# Patient Record
Sex: FEMALE | Race: WHITE | Employment: OTHER | URBAN - METROPOLITAN AREA
[De-identification: names, ages, dates, MRNs, and addresses within clinical notes are randomized per-mention and may not be internally consistent; named-entity substitution may affect disease eponyms.]

---

## 2018-05-17 ENCOUNTER — APPOINTMENT (OUTPATIENT)
Dept: LAB | Age: 83
End: 2018-05-17
Attending: FAMILY MEDICINE
Payer: MEDICARE

## 2018-05-17 DIAGNOSIS — I50.9 CHRONIC CONGESTIVE HEART FAILURE, UNSPECIFIED HEART FAILURE TYPE (HCC): ICD-10-CM

## 2018-05-17 DIAGNOSIS — R60.0 LOCALIZED EDEMA: ICD-10-CM

## 2018-05-17 PROCEDURE — 80048 BASIC METABOLIC PNL TOTAL CA: CPT

## 2018-05-17 PROCEDURE — 36415 COLL VENOUS BLD VENIPUNCTURE: CPT

## 2018-08-16 ENCOUNTER — APPOINTMENT (OUTPATIENT)
Dept: LAB | Age: 83
End: 2018-08-16
Attending: FAMILY MEDICINE
Payer: MEDICARE

## 2018-08-16 DIAGNOSIS — I50.9 CONGESTIVE HEART FAILURE, UNSPECIFIED HF CHRONICITY, UNSPECIFIED HEART FAILURE TYPE (HCC): ICD-10-CM

## 2018-08-16 DIAGNOSIS — I50.32 CHRONIC DIASTOLIC HEART FAILURE (HCC): ICD-10-CM

## 2018-08-16 LAB
ANION GAP SERPL CALC-SCNC: 7 MMOL/L (ref 0–18)
BUN BLD-MCNC: 14 MG/DL (ref 8–20)
BUN/CREAT SERPL: 14.7 (ref 10–20)
CALCIUM BLD-MCNC: 8.9 MG/DL (ref 8.3–10.3)
CHLORIDE SERPL-SCNC: 107 MMOL/L (ref 101–111)
CO2 SERPL-SCNC: 27 MMOL/L (ref 22–32)
CREAT BLD-MCNC: 0.95 MG/DL (ref 0.55–1.02)
GLUCOSE BLD-MCNC: 109 MG/DL (ref 70–99)
OSMOLALITY SERPL CALC.SUM OF ELEC: 293 MOSM/KG (ref 275–295)
POTASSIUM SERPL-SCNC: 4.1 MMOL/L (ref 3.6–5.1)
SODIUM SERPL-SCNC: 141 MMOL/L (ref 136–144)

## 2018-08-16 PROCEDURE — 80048 BASIC METABOLIC PNL TOTAL CA: CPT

## 2018-08-16 PROCEDURE — 36415 COLL VENOUS BLD VENIPUNCTURE: CPT

## 2018-11-29 ENCOUNTER — APPOINTMENT (OUTPATIENT)
Dept: LAB | Age: 83
End: 2018-11-29
Attending: FAMILY MEDICINE
Payer: MEDICARE

## 2018-11-29 DIAGNOSIS — I50.32 CHRONIC DIASTOLIC HEART FAILURE (HCC): ICD-10-CM

## 2018-11-29 DIAGNOSIS — R60.9 EDEMA, UNSPECIFIED TYPE: ICD-10-CM

## 2018-11-29 PROCEDURE — 80048 BASIC METABOLIC PNL TOTAL CA: CPT

## 2018-11-29 PROCEDURE — 36415 COLL VENOUS BLD VENIPUNCTURE: CPT

## 2019-06-13 ENCOUNTER — APPOINTMENT (OUTPATIENT)
Dept: LAB | Age: 84
End: 2019-06-13
Attending: FAMILY MEDICINE
Payer: MEDICARE

## 2019-06-13 DIAGNOSIS — I50.9 CONGESTIVE HEART FAILURE, UNSPECIFIED HF CHRONICITY, UNSPECIFIED HEART FAILURE TYPE (HCC): ICD-10-CM

## 2019-06-13 DIAGNOSIS — R60.9 EDEMA, UNSPECIFIED TYPE: ICD-10-CM

## 2019-06-13 PROCEDURE — 80048 BASIC METABOLIC PNL TOTAL CA: CPT

## 2019-06-13 PROCEDURE — 36415 COLL VENOUS BLD VENIPUNCTURE: CPT

## 2019-08-06 ENCOUNTER — APPOINTMENT (OUTPATIENT)
Dept: LAB | Age: 84
End: 2019-08-06
Attending: FAMILY MEDICINE
Payer: MEDICARE

## 2019-08-06 DIAGNOSIS — E03.9 HYPOTHYROIDISM, UNSPECIFIED TYPE: ICD-10-CM

## 2019-08-06 DIAGNOSIS — I50.32 CHRONIC DIASTOLIC HEART FAILURE (HCC): ICD-10-CM

## 2019-08-06 LAB
ANION GAP SERPL CALC-SCNC: 7 MMOL/L (ref 0–18)
BUN BLD-MCNC: 14 MG/DL (ref 7–18)
BUN/CREAT SERPL: 15.6 (ref 10–20)
CALCIUM BLD-MCNC: 9.2 MG/DL (ref 8.5–10.1)
CHLORIDE SERPL-SCNC: 107 MMOL/L (ref 98–112)
CO2 SERPL-SCNC: 24 MMOL/L (ref 21–32)
CREAT BLD-MCNC: 0.9 MG/DL (ref 0.55–1.02)
GLUCOSE BLD-MCNC: 102 MG/DL (ref 70–99)
OSMOLALITY SERPL CALC.SUM OF ELEC: 287 MOSM/KG (ref 275–295)
POTASSIUM SERPL-SCNC: 4.2 MMOL/L (ref 3.5–5.1)
SODIUM SERPL-SCNC: 138 MMOL/L (ref 136–145)
TSI SER-ACNC: 3.63 MIU/ML (ref 0.36–3.74)

## 2019-08-06 PROCEDURE — 84443 ASSAY THYROID STIM HORMONE: CPT

## 2019-08-06 PROCEDURE — 36415 COLL VENOUS BLD VENIPUNCTURE: CPT

## 2019-08-06 PROCEDURE — 80048 BASIC METABOLIC PNL TOTAL CA: CPT

## 2019-09-17 ENCOUNTER — APPOINTMENT (OUTPATIENT)
Dept: GENERAL RADIOLOGY | Facility: HOSPITAL | Age: 84
End: 2019-09-17
Attending: EMERGENCY MEDICINE
Payer: MEDICARE

## 2019-09-17 ENCOUNTER — HOSPITAL ENCOUNTER (EMERGENCY)
Facility: HOSPITAL | Age: 84
Discharge: HOME | End: 2019-09-17
Attending: EMERGENCY MEDICINE
Payer: MEDICARE

## 2019-09-17 ENCOUNTER — APPOINTMENT (OUTPATIENT)
Dept: ULTRASOUND IMAGING | Facility: HOSPITAL | Age: 84
End: 2019-09-17
Attending: EMERGENCY MEDICINE
Payer: MEDICARE

## 2019-09-17 VITALS
HEART RATE: 65 BPM | BODY MASS INDEX: 48.73 KG/M2 | RESPIRATION RATE: 18 BRPM | TEMPERATURE: 98 F | SYSTOLIC BLOOD PRESSURE: 133 MMHG | DIASTOLIC BLOOD PRESSURE: 53 MMHG | OXYGEN SATURATION: 94 % | HEIGHT: 63 IN | WEIGHT: 275 LBS

## 2019-09-17 DIAGNOSIS — L03.119 CELLULITIS OF LOWER EXTREMITY, UNSPECIFIED LATERALITY: Primary | ICD-10-CM

## 2019-09-17 LAB
ALBUMIN SERPL-MCNC: 3.4 G/DL (ref 3.4–5)
ALBUMIN/GLOB SERPL: 0.8 {RATIO} (ref 1–2)
ALP LIVER SERPL-CCNC: 65 U/L (ref 55–142)
ALT SERPL-CCNC: 15 U/L (ref 13–56)
ANION GAP SERPL CALC-SCNC: 7 MMOL/L (ref 0–18)
AST SERPL-CCNC: 16 U/L (ref 15–37)
BASOPHILS # BLD AUTO: 0.09 X10(3) UL (ref 0–0.2)
BASOPHILS NFR BLD AUTO: 1 %
BILIRUB SERPL-MCNC: 0.3 MG/DL (ref 0.1–2)
BILIRUB UR QL STRIP.AUTO: NEGATIVE
BUN BLD-MCNC: 16 MG/DL (ref 7–18)
BUN/CREAT SERPL: 14.7 (ref 10–20)
CALCIUM BLD-MCNC: 8.9 MG/DL (ref 8.5–10.1)
CHLORIDE SERPL-SCNC: 105 MMOL/L (ref 98–112)
CLARITY UR REFRACT.AUTO: CLEAR
CO2 SERPL-SCNC: 26 MMOL/L (ref 21–32)
CREAT BLD-MCNC: 1.09 MG/DL (ref 0.55–1.02)
DEPRECATED RDW RBC AUTO: 47.9 FL (ref 35.1–46.3)
EOSINOPHIL # BLD AUTO: 0.32 X10(3) UL (ref 0–0.7)
EOSINOPHIL NFR BLD AUTO: 3.7 %
ERYTHROCYTE [DISTWIDTH] IN BLOOD BY AUTOMATED COUNT: 13 % (ref 11–15)
GLOBULIN PLAS-MCNC: 4.2 G/DL (ref 2.8–4.4)
GLUCOSE BLD-MCNC: 149 MG/DL (ref 70–99)
GLUCOSE UR STRIP.AUTO-MCNC: NEGATIVE MG/DL
HCT VFR BLD AUTO: 47 % (ref 35–48)
HGB BLD-MCNC: 14.9 G/DL (ref 12–16)
HYALINE CASTS #/AREA URNS AUTO: PRESENT /LPF
IMM GRANULOCYTES # BLD AUTO: 0.04 X10(3) UL (ref 0–1)
IMM GRANULOCYTES NFR BLD: 0.5 %
KETONES UR STRIP.AUTO-MCNC: NEGATIVE MG/DL
LEUKOCYTE ESTERASE UR QL STRIP.AUTO: NEGATIVE
LYMPHOCYTES # BLD AUTO: 1.13 X10(3) UL (ref 1–4)
LYMPHOCYTES NFR BLD AUTO: 13 %
M PROTEIN MFR SERPL ELPH: 7.6 G/DL (ref 6.4–8.2)
MCH RBC QN AUTO: 31.6 PG (ref 26–34)
MCHC RBC AUTO-ENTMCNC: 31.7 G/DL (ref 31–37)
MCV RBC AUTO: 99.8 FL (ref 80–100)
MONOCYTES # BLD AUTO: 0.82 X10(3) UL (ref 0.1–1)
MONOCYTES NFR BLD AUTO: 9.4 %
NEUTROPHILS # BLD AUTO: 6.29 X10 (3) UL (ref 1.5–7.7)
NEUTROPHILS # BLD AUTO: 6.29 X10(3) UL (ref 1.5–7.7)
NEUTROPHILS NFR BLD AUTO: 72.4 %
NITRITE UR QL STRIP.AUTO: NEGATIVE
NT-PROBNP SERPL-MCNC: 195 PG/ML (ref ?–450)
OSMOLALITY SERPL CALC.SUM OF ELEC: 290 MOSM/KG (ref 275–295)
PH UR STRIP.AUTO: 6 [PH] (ref 4.5–8)
PLATELET # BLD AUTO: 199 10(3)UL (ref 150–450)
POTASSIUM SERPL-SCNC: 4 MMOL/L (ref 3.5–5.1)
PROT UR STRIP.AUTO-MCNC: NEGATIVE MG/DL
RBC # BLD AUTO: 4.71 X10(6)UL (ref 3.8–5.3)
SODIUM SERPL-SCNC: 138 MMOL/L (ref 136–145)
SP GR UR STRIP.AUTO: 1.01 (ref 1–1.03)
UROBILINOGEN UR STRIP.AUTO-MCNC: <2 MG/DL
WBC # BLD AUTO: 8.7 X10(3) UL (ref 4–11)

## 2019-09-17 PROCEDURE — 80053 COMPREHEN METABOLIC PANEL: CPT | Performed by: EMERGENCY MEDICINE

## 2019-09-17 PROCEDURE — 36415 COLL VENOUS BLD VENIPUNCTURE: CPT

## 2019-09-17 PROCEDURE — 99285 EMERGENCY DEPT VISIT HI MDM: CPT

## 2019-09-17 PROCEDURE — 81001 URINALYSIS AUTO W/SCOPE: CPT | Performed by: EMERGENCY MEDICINE

## 2019-09-17 PROCEDURE — 83880 ASSAY OF NATRIURETIC PEPTIDE: CPT | Performed by: EMERGENCY MEDICINE

## 2019-09-17 PROCEDURE — 93970 EXTREMITY STUDY: CPT | Performed by: EMERGENCY MEDICINE

## 2019-09-17 PROCEDURE — 96374 THER/PROPH/DIAG INJ IV PUSH: CPT

## 2019-09-17 PROCEDURE — 87040 BLOOD CULTURE FOR BACTERIA: CPT | Performed by: EMERGENCY MEDICINE

## 2019-09-17 PROCEDURE — 99284 EMERGENCY DEPT VISIT MOD MDM: CPT

## 2019-09-17 PROCEDURE — 85025 COMPLETE CBC W/AUTO DIFF WBC: CPT | Performed by: EMERGENCY MEDICINE

## 2019-09-17 PROCEDURE — 71045 X-RAY EXAM CHEST 1 VIEW: CPT | Performed by: EMERGENCY MEDICINE

## 2019-09-17 RX ORDER — CEPHALEXIN 500 MG/1
500 CAPSULE ORAL 3 TIMES DAILY
Qty: 30 CAPSULE | Refills: 0 | Status: SHIPPED | OUTPATIENT
Start: 2019-09-17 | End: 2019-09-27

## 2019-09-17 RX ORDER — ATENOLOL 50 MG/1
50 TABLET ORAL DAILY
COMMUNITY

## 2019-09-17 RX ORDER — CEFAZOLIN SODIUM/WATER 2 G/20 ML
2 SYRINGE (ML) INTRAVENOUS ONCE
Status: COMPLETED | OUTPATIENT
Start: 2019-09-17 | End: 2019-09-17

## 2019-09-17 NOTE — PROGRESS NOTES
Pharmacy Note:   [employer] approved dose adjustment for: Damion Kerr has been prescribed Ancef 1 gram IV x 1 dose for the ER. CrCl cannot be calculated (Patient's most recent lab result is older than the maximum 7 days allowed. ).  Body mass inde

## 2019-09-17 NOTE — ED PROVIDER NOTES
Patient Seen in: [facility] Emergency Department      History   Patient presents with:  Swelling Edema (cardiovascular, metabolic)    Stated Complaint: swelling to lower extremities    HPI    Patient is a 26-year-old female, medical history noted be Quit date: 1978        Years since quittin.7      Smokeless tobacco: Never Used    Alcohol use: No    Drug use: No             Review of Systems    Positive for stated complaint: swelling to lower extremities  Other systems are as noted in HPI.   C other components within normal limits   URINALYSIS WITH CULTURE REFLEX - Abnormal; Notable for the following components:    Blood Urine Moderate (*)     RBC URINE 6-10 (*)     Bacteria Urine Rare (*)     Squamous Epi.  Cells Moderate (*)     Hyaline Casts P (cpt=71045)    Result Date: 9/17/2019  CONCLUSION:  Subtle left lower lobe consolidation. Mild pulmonary vascular congestion is noted.     Dictated by: Jennifer Guillen MD on 9/17/2019 at 13:37     Approved by: Jennifer Guillen MD              We will

## 2019-09-17 NOTE — ED NOTES
RN spoke with [facility] dispatch in regards to ETA. They stated someone was on their way and anticipated  time was 1845. Patient and family updated.

## 2019-09-17 NOTE — ED INITIAL ASSESSMENT (HPI)
Bilateral lower extremity swelling x1 week. No improvement with lasix Rash that is warm to touch to bilateral extremities x1 week.  No improvement with topical ointment

## 2019-09-26 ENCOUNTER — APPOINTMENT (OUTPATIENT)
Dept: LAB | Age: 84
End: 2019-09-26
Attending: FAMILY MEDICINE
Payer: MEDICARE

## 2019-09-26 DIAGNOSIS — I50.9 CONGESTIVE HEART FAILURE, UNSPECIFIED HF CHRONICITY, UNSPECIFIED HEART FAILURE TYPE (HCC): ICD-10-CM

## 2019-09-26 LAB
ANION GAP SERPL CALC-SCNC: 10 MMOL/L (ref 0–18)
BUN BLD-MCNC: 17 MG/DL (ref 7–18)
BUN/CREAT SERPL: 15.7 (ref 10–20)
CALCIUM BLD-MCNC: 9 MG/DL (ref 8.5–10.1)
CHLORIDE SERPL-SCNC: 106 MMOL/L (ref 98–112)
CO2 SERPL-SCNC: 26 MMOL/L (ref 21–32)
CREAT BLD-MCNC: 1.08 MG/DL (ref 0.55–1.02)
GLUCOSE BLD-MCNC: 124 MG/DL (ref 70–99)
OSMOLALITY SERPL CALC.SUM OF ELEC: 297 MOSM/KG (ref 275–295)
POTASSIUM SERPL-SCNC: 4.2 MMOL/L (ref 3.5–5.1)
SODIUM SERPL-SCNC: 142 MMOL/L (ref 136–145)

## 2019-09-26 PROCEDURE — 80048 BASIC METABOLIC PNL TOTAL CA: CPT

## 2019-09-26 PROCEDURE — 36415 COLL VENOUS BLD VENIPUNCTURE: CPT

## 2019-10-24 ENCOUNTER — APPOINTMENT (OUTPATIENT)
Dept: LAB | Age: 84
End: 2019-10-24
Attending: FAMILY MEDICINE
Payer: MEDICARE

## 2019-10-24 DIAGNOSIS — I50.9 CONGESTIVE HEART FAILURE, UNSPECIFIED HF CHRONICITY, UNSPECIFIED HEART FAILURE TYPE (HCC): ICD-10-CM

## 2019-10-24 PROCEDURE — 80048 BASIC METABOLIC PNL TOTAL CA: CPT

## 2019-10-24 PROCEDURE — 36415 COLL VENOUS BLD VENIPUNCTURE: CPT

## 2019-12-05 ENCOUNTER — APPOINTMENT (OUTPATIENT)
Dept: LAB | Age: 84
End: 2019-12-05
Attending: FAMILY MEDICINE
Payer: MEDICARE

## 2019-12-05 DIAGNOSIS — R60.9 EDEMA, UNSPECIFIED TYPE: ICD-10-CM

## 2019-12-05 DIAGNOSIS — I50.9 CONGESTIVE HEART FAILURE, UNSPECIFIED HF CHRONICITY, UNSPECIFIED HEART FAILURE TYPE (HCC): ICD-10-CM

## 2019-12-05 PROCEDURE — 80048 BASIC METABOLIC PNL TOTAL CA: CPT

## 2019-12-05 PROCEDURE — 36415 COLL VENOUS BLD VENIPUNCTURE: CPT

## 2020-01-02 ENCOUNTER — APPOINTMENT (OUTPATIENT)
Dept: LAB | Age: 85
End: 2020-01-02
Attending: FAMILY MEDICINE
Payer: MEDICARE

## 2020-01-02 DIAGNOSIS — I50.32 CHRONIC DIASTOLIC HEART FAILURE (HCC): ICD-10-CM

## 2020-01-02 LAB
ANION GAP SERPL CALC-SCNC: 5 MMOL/L (ref 0–18)
BUN BLD-MCNC: 15 MG/DL (ref 7–18)
BUN/CREAT SERPL: 16.3 (ref 10–20)
CALCIUM BLD-MCNC: 8.9 MG/DL (ref 8.5–10.1)
CHLORIDE SERPL-SCNC: 108 MMOL/L (ref 98–112)
CO2 SERPL-SCNC: 28 MMOL/L (ref 21–32)
CREAT BLD-MCNC: 0.92 MG/DL (ref 0.55–1.02)
GLUCOSE BLD-MCNC: 107 MG/DL (ref 70–99)
OSMOLALITY SERPL CALC.SUM OF ELEC: 293 MOSM/KG (ref 275–295)
PATIENT FASTING Y/N/NP: YES
POTASSIUM SERPL-SCNC: 4.7 MMOL/L (ref 3.5–5.1)
SODIUM SERPL-SCNC: 141 MMOL/L (ref 136–145)

## 2020-01-02 PROCEDURE — 36415 COLL VENOUS BLD VENIPUNCTURE: CPT

## 2020-01-02 PROCEDURE — 80048 BASIC METABOLIC PNL TOTAL CA: CPT

## 2020-01-16 ENCOUNTER — NURSE ONLY (OUTPATIENT)
Dept: LAB | Age: 85
End: 2020-01-16
Attending: INTERNAL MEDICINE
Payer: MEDICARE

## 2020-01-16 DIAGNOSIS — Z00.00 ENCOUNTER FOR INITIAL PREVENTIVE PHYSICAL EXAMINATION COVERED BY MEDICARE: ICD-10-CM

## 2020-01-16 DIAGNOSIS — I50.9 CONGESTIVE HEART FAILURE, UNSPECIFIED HF CHRONICITY, UNSPECIFIED HEART FAILURE TYPE (HCC): ICD-10-CM

## 2020-01-16 DIAGNOSIS — E66.9 OBESITY, UNSPECIFIED CLASSIFICATION, UNSPECIFIED OBESITY TYPE, UNSPECIFIED WHETHER SERIOUS COMORBIDITY PRESENT: ICD-10-CM

## 2020-01-16 LAB
ALBUMIN SERPL-MCNC: 3.4 G/DL (ref 3.4–5)
ALBUMIN/GLOB SERPL: 0.8 {RATIO} (ref 1–2)
ALP LIVER SERPL-CCNC: 58 U/L (ref 55–142)
ALT SERPL-CCNC: 17 U/L (ref 13–56)
ANION GAP SERPL CALC-SCNC: 9 MMOL/L (ref 0–18)
AST SERPL-CCNC: 18 U/L (ref 15–37)
BASOPHILS # BLD AUTO: 0.08 X10(3) UL (ref 0–0.2)
BASOPHILS NFR BLD AUTO: 1 %
BILIRUB SERPL-MCNC: 0.5 MG/DL (ref 0.1–2)
BUN BLD-MCNC: 16 MG/DL (ref 7–18)
BUN/CREAT SERPL: 16.2 (ref 10–20)
CALCIUM BLD-MCNC: 9.4 MG/DL (ref 8.5–10.1)
CHLORIDE SERPL-SCNC: 107 MMOL/L (ref 98–112)
CHOLEST SMN-MCNC: 124 MG/DL (ref ?–200)
CO2 SERPL-SCNC: 26 MMOL/L (ref 21–32)
CREAT BLD-MCNC: 0.99 MG/DL (ref 0.55–1.02)
DEPRECATED RDW RBC AUTO: 49.1 FL (ref 35.1–46.3)
EOSINOPHIL # BLD AUTO: 0.46 X10(3) UL (ref 0–0.7)
EOSINOPHIL NFR BLD AUTO: 5.5 %
ERYTHROCYTE [DISTWIDTH] IN BLOOD BY AUTOMATED COUNT: 13.2 % (ref 11–15)
FOLATE SERPL-MCNC: 10.5 NG/ML (ref 8.7–?)
GLOBULIN PLAS-MCNC: 4.5 G/DL (ref 2.8–4.4)
GLUCOSE BLD-MCNC: 110 MG/DL (ref 70–99)
HCT VFR BLD AUTO: 46.4 % (ref 35–48)
HDLC SERPL-MCNC: 32 MG/DL (ref 40–59)
HGB BLD-MCNC: 14.6 G/DL (ref 12–16)
IMM GRANULOCYTES # BLD AUTO: 0.01 X10(3) UL (ref 0–1)
IMM GRANULOCYTES NFR BLD: 0.1 %
LDLC SERPL CALC-MCNC: 62 MG/DL (ref ?–100)
LYMPHOCYTES # BLD AUTO: 2.14 X10(3) UL (ref 1–4)
LYMPHOCYTES NFR BLD AUTO: 25.5 %
M PROTEIN MFR SERPL ELPH: 7.9 G/DL (ref 6.4–8.2)
MCH RBC QN AUTO: 31.9 PG (ref 26–34)
MCHC RBC AUTO-ENTMCNC: 31.5 G/DL (ref 31–37)
MCV RBC AUTO: 101.3 FL (ref 80–100)
MONOCYTES # BLD AUTO: 0.77 X10(3) UL (ref 0.1–1)
MONOCYTES NFR BLD AUTO: 9.2 %
NEUTROPHILS # BLD AUTO: 4.93 X10 (3) UL (ref 1.5–7.7)
NEUTROPHILS # BLD AUTO: 4.93 X10(3) UL (ref 1.5–7.7)
NEUTROPHILS NFR BLD AUTO: 58.7 %
NONHDLC SERPL-MCNC: 92 MG/DL (ref ?–130)
OSMOLALITY SERPL CALC.SUM OF ELEC: 296 MOSM/KG (ref 275–295)
PATIENT FASTING Y/N/NP: YES
PATIENT FASTING Y/N/NP: YES
PLATELET # BLD AUTO: 217 10(3)UL (ref 150–450)
POTASSIUM SERPL-SCNC: 3.6 MMOL/L (ref 3.5–5.1)
RBC # BLD AUTO: 4.58 X10(6)UL (ref 3.8–5.3)
SODIUM SERPL-SCNC: 142 MMOL/L (ref 136–145)
TRIGL SERPL-MCNC: 152 MG/DL (ref 30–149)
TSI SER-ACNC: 3.61 MIU/ML (ref 0.36–3.74)
VIT B12 SERPL-MCNC: 823 PG/ML (ref 193–986)
VIT D+METAB SERPL-MCNC: 31.4 NG/ML (ref 30–100)
VLDLC SERPL CALC-MCNC: 30 MG/DL (ref 0–30)
WBC # BLD AUTO: 8.4 X10(3) UL (ref 4–11)

## 2020-01-16 PROCEDURE — 82746 ASSAY OF FOLIC ACID SERUM: CPT

## 2020-01-16 PROCEDURE — 82607 VITAMIN B-12: CPT

## 2020-01-16 PROCEDURE — 84443 ASSAY THYROID STIM HORMONE: CPT

## 2020-01-16 PROCEDURE — 82306 VITAMIN D 25 HYDROXY: CPT

## 2020-01-16 PROCEDURE — 36415 COLL VENOUS BLD VENIPUNCTURE: CPT

## 2020-01-16 PROCEDURE — 85025 COMPLETE CBC W/AUTO DIFF WBC: CPT

## 2020-01-16 PROCEDURE — 80061 LIPID PANEL: CPT

## 2020-01-16 PROCEDURE — 80053 COMPREHEN METABOLIC PANEL: CPT

## 2020-04-14 ENCOUNTER — NURSE ONLY (OUTPATIENT)
Dept: LAB | Age: 85
End: 2020-04-14
Attending: INTERNAL MEDICINE
Payer: MEDICARE

## 2020-04-14 DIAGNOSIS — I50.9 CONGESTIVE HEART FAILURE, UNSPECIFIED HF CHRONICITY, UNSPECIFIED HEART FAILURE TYPE (HCC): ICD-10-CM

## 2020-04-14 DIAGNOSIS — R60.0 LOCALIZED EDEMA: ICD-10-CM

## 2020-04-14 PROCEDURE — 36415 COLL VENOUS BLD VENIPUNCTURE: CPT

## 2020-04-14 PROCEDURE — 80053 COMPREHEN METABOLIC PANEL: CPT

## 2020-04-14 PROCEDURE — 85025 COMPLETE CBC W/AUTO DIFF WBC: CPT

## 2020-07-14 ENCOUNTER — NURSE ONLY (OUTPATIENT)
Dept: LAB | Age: 85
End: 2020-07-14
Attending: INTERNAL MEDICINE
Payer: MEDICARE

## 2020-07-14 DIAGNOSIS — I50.32 CHRONIC DIASTOLIC HEART FAILURE (HCC): ICD-10-CM

## 2020-07-14 DIAGNOSIS — R60.9 EDEMA, UNSPECIFIED TYPE: ICD-10-CM

## 2020-07-14 LAB
ALBUMIN SERPL-MCNC: 3 G/DL (ref 3.4–5)
ALBUMIN/GLOB SERPL: 0.8 {RATIO} (ref 1–2)
ALP LIVER SERPL-CCNC: 54 U/L (ref 55–142)
ALT SERPL-CCNC: 11 U/L (ref 13–56)
ANION GAP SERPL CALC-SCNC: 5 MMOL/L (ref 0–18)
AST SERPL-CCNC: 14 U/L (ref 15–37)
BASOPHILS # BLD AUTO: 0.07 X10(3) UL (ref 0–0.2)
BASOPHILS NFR BLD AUTO: 0.7 %
BILIRUB SERPL-MCNC: 0.5 MG/DL (ref 0.1–2)
BUN BLD-MCNC: 14 MG/DL (ref 7–18)
BUN/CREAT SERPL: 13.3 (ref 10–20)
CALCIUM BLD-MCNC: 8.9 MG/DL (ref 8.5–10.1)
CHLORIDE SERPL-SCNC: 107 MMOL/L (ref 98–112)
CO2 SERPL-SCNC: 30 MMOL/L (ref 21–32)
CREAT BLD-MCNC: 1.05 MG/DL (ref 0.55–1.02)
DEPRECATED RDW RBC AUTO: 48 FL (ref 35.1–46.3)
EOSINOPHIL # BLD AUTO: 0.54 X10(3) UL (ref 0–0.7)
EOSINOPHIL NFR BLD AUTO: 5.4 %
ERYTHROCYTE [DISTWIDTH] IN BLOOD BY AUTOMATED COUNT: 12.8 % (ref 11–15)
GLOBULIN PLAS-MCNC: 4 G/DL (ref 2.8–4.4)
GLUCOSE BLD-MCNC: 92 MG/DL (ref 70–99)
HCT VFR BLD AUTO: 42.6 % (ref 35–48)
HGB BLD-MCNC: 13.2 G/DL (ref 12–16)
IMM GRANULOCYTES # BLD AUTO: 0.03 X10(3) UL (ref 0–1)
IMM GRANULOCYTES NFR BLD: 0.3 %
LYMPHOCYTES # BLD AUTO: 2.64 X10(3) UL (ref 1–4)
LYMPHOCYTES NFR BLD AUTO: 26.4 %
M PROTEIN MFR SERPL ELPH: 7 G/DL (ref 6.4–8.2)
MCH RBC QN AUTO: 31.7 PG (ref 26–34)
MCHC RBC AUTO-ENTMCNC: 31 G/DL (ref 31–37)
MCV RBC AUTO: 102.2 FL (ref 80–100)
MONOCYTES # BLD AUTO: 0.96 X10(3) UL (ref 0.1–1)
MONOCYTES NFR BLD AUTO: 9.6 %
NEUTROPHILS # BLD AUTO: 5.75 X10 (3) UL (ref 1.5–7.7)
NEUTROPHILS # BLD AUTO: 5.75 X10(3) UL (ref 1.5–7.7)
NEUTROPHILS NFR BLD AUTO: 57.6 %
OSMOLALITY SERPL CALC.SUM OF ELEC: 294 MOSM/KG (ref 275–295)
PATIENT FASTING Y/N/NP: YES
PLATELET # BLD AUTO: 203 10(3)UL (ref 150–450)
POTASSIUM SERPL-SCNC: 4.2 MMOL/L (ref 3.5–5.1)
RBC # BLD AUTO: 4.17 X10(6)UL (ref 3.8–5.3)
SODIUM SERPL-SCNC: 142 MMOL/L (ref 136–145)
WBC # BLD AUTO: 10 X10(3) UL (ref 4–11)

## 2020-07-14 PROCEDURE — 85025 COMPLETE CBC W/AUTO DIFF WBC: CPT

## 2020-07-14 PROCEDURE — 80053 COMPREHEN METABOLIC PANEL: CPT

## 2020-07-14 PROCEDURE — 36415 COLL VENOUS BLD VENIPUNCTURE: CPT

## 2020-10-20 ENCOUNTER — NURSE ONLY (OUTPATIENT)
Dept: LAB | Age: 85
End: 2020-10-20
Attending: INTERNAL MEDICINE

## 2020-10-20 DIAGNOSIS — R60.0 LOCALIZED EDEMA: ICD-10-CM

## 2020-10-20 DIAGNOSIS — I50.32 CHRONIC DIASTOLIC HEART FAILURE (HCC): ICD-10-CM

## 2020-10-20 PROCEDURE — 80053 COMPREHEN METABOLIC PANEL: CPT

## 2020-10-20 PROCEDURE — 36415 COLL VENOUS BLD VENIPUNCTURE: CPT

## 2020-10-20 PROCEDURE — 85025 COMPLETE CBC W/AUTO DIFF WBC: CPT

## 2020-11-14 ENCOUNTER — APPOINTMENT (OUTPATIENT)
Dept: CT IMAGING | Facility: HOSPITAL | Age: 85
End: 2020-11-14
Attending: EMERGENCY MEDICINE
Payer: MEDICARE

## 2020-11-14 ENCOUNTER — HOSPITAL ENCOUNTER (EMERGENCY)
Facility: HOSPITAL | Age: 85
Discharge: HOME | End: 2020-11-14
Attending: EMERGENCY MEDICINE
Payer: MEDICARE

## 2020-11-14 ENCOUNTER — APPOINTMENT (OUTPATIENT)
Dept: GENERAL RADIOLOGY | Facility: HOSPITAL | Age: 85
End: 2020-11-14
Payer: MEDICARE

## 2020-11-14 VITALS
HEIGHT: 62 IN | OXYGEN SATURATION: 98 % | HEART RATE: 70 BPM | SYSTOLIC BLOOD PRESSURE: 169 MMHG | TEMPERATURE: 98 F | WEIGHT: 274.94 LBS | RESPIRATION RATE: 18 BRPM | DIASTOLIC BLOOD PRESSURE: 77 MMHG | BODY MASS INDEX: 50.59 KG/M2

## 2020-11-14 DIAGNOSIS — L03.119 CELLULITIS OF LOWER EXTREMITY, UNSPECIFIED LATERALITY: Primary | ICD-10-CM

## 2020-11-14 DIAGNOSIS — R53.1 WEAKNESS GENERALIZED: ICD-10-CM

## 2020-11-14 DIAGNOSIS — R60.0 EXTREMITY EDEMA: ICD-10-CM

## 2020-11-14 PROCEDURE — 80053 COMPREHEN METABOLIC PANEL: CPT | Performed by: EMERGENCY MEDICINE

## 2020-11-14 PROCEDURE — 71045 X-RAY EXAM CHEST 1 VIEW: CPT | Performed by: EMERGENCY MEDICINE

## 2020-11-14 PROCEDURE — 70450 CT HEAD/BRAIN W/O DYE: CPT | Performed by: EMERGENCY MEDICINE

## 2020-11-14 PROCEDURE — 83735 ASSAY OF MAGNESIUM: CPT | Performed by: EMERGENCY MEDICINE

## 2020-11-14 PROCEDURE — 96366 THER/PROPH/DIAG IV INF ADDON: CPT

## 2020-11-14 PROCEDURE — 93010 ELECTROCARDIOGRAM REPORT: CPT

## 2020-11-14 PROCEDURE — 99285 EMERGENCY DEPT VISIT HI MDM: CPT

## 2020-11-14 PROCEDURE — 96375 TX/PRO/DX INJ NEW DRUG ADDON: CPT

## 2020-11-14 PROCEDURE — 84484 ASSAY OF TROPONIN QUANT: CPT | Performed by: EMERGENCY MEDICINE

## 2020-11-14 PROCEDURE — 85025 COMPLETE CBC W/AUTO DIFF WBC: CPT | Performed by: EMERGENCY MEDICINE

## 2020-11-14 PROCEDURE — 93005 ELECTROCARDIOGRAM TRACING: CPT

## 2020-11-14 PROCEDURE — 83880 ASSAY OF NATRIURETIC PEPTIDE: CPT | Performed by: EMERGENCY MEDICINE

## 2020-11-14 PROCEDURE — 96365 THER/PROPH/DIAG IV INF INIT: CPT

## 2020-11-14 PROCEDURE — 81003 URINALYSIS AUTO W/O SCOPE: CPT | Performed by: EMERGENCY MEDICINE

## 2020-11-14 RX ORDER — SULFAMETHOXAZOLE AND TRIMETHOPRIM 800; 160 MG/1; MG/1
1 TABLET ORAL 2 TIMES DAILY
Qty: 14 TABLET | Refills: 0 | Status: SHIPPED | OUTPATIENT
Start: 2020-11-14 | End: 2020-11-21

## 2020-11-14 RX ORDER — VANCOMYCIN 2 GRAM/500 ML IN 0.9 % SODIUM CHLORIDE INTRAVENOUS
25 ONCE
Status: COMPLETED | OUTPATIENT
Start: 2020-11-14 | End: 2020-11-14

## 2020-11-14 RX ORDER — FUROSEMIDE 10 MG/ML
40 INJECTION INTRAMUSCULAR; INTRAVENOUS ONCE
Status: COMPLETED | OUTPATIENT
Start: 2020-11-14 | End: 2020-11-14

## 2020-11-14 NOTE — ED PROVIDER NOTES
Patient Seen in: [facility] Emergency Department      History   Patient presents with:  Fatigue  Fall    Stated Complaint: sob weakness    HPI    Patient presents with leg weakness and swelling.   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 PATIENT STATED HISTORY: (As transcribed by Technologist)  AMS    FINDINGS/CONCLUSION:            1. Postoperative changes from right frontal cheryl hole for the purposes of ventriculostomy catheter placement.   The cat by (CST): Airam Davenport MD on 11/14/2020 at 10:37 AM       The patient's son states that he was told the tumor in her brain was the size of a golf ball.   There is not prior imaging available at this time for direct comparison but there does not appear to tablet by mouth 2 (two) times daily for 7 days.   Qty: 14 tablet Refills: 0

## 2020-11-14 NOTE — ED NOTES
Son is concerned that his mother is deteriorating and feels that she need a higher level of care.  I called  [facility]

## 2020-11-14 NOTE — CM/SW NOTE
Met with patient and son. They would like to know results of head CT before discussing disposition. Referral sent via [facility] to [facility] in case hospitalization not needed.

## 2020-11-14 NOTE — CM/SW NOTE
Patient accepted at [facility]. Merlene dave will get [employer] approval on the back end. ED CM feels SNF stay for PT is very appropriate and patient is rehabable.

## 2022-01-01 ENCOUNTER — HOSPITAL ENCOUNTER (INPATIENT)
Facility: HOSPITAL | Age: 87
LOS: 2 days | End: 2022-01-01
Attending: EMERGENCY MEDICINE | Admitting: HOSPITALIST
Payer: MEDICARE

## 2022-01-01 ENCOUNTER — APPOINTMENT (OUTPATIENT)
Dept: CV DIAGNOSTICS | Facility: HOSPITAL | Age: 87
End: 2022-01-01
Attending: HOSPITALIST
Payer: MEDICARE

## 2022-01-01 ENCOUNTER — APPOINTMENT (OUTPATIENT)
Dept: GENERAL RADIOLOGY | Facility: HOSPITAL | Age: 87
End: 2022-01-01
Attending: HOSPITALIST
Payer: MEDICARE

## 2022-01-01 VITALS
RESPIRATION RATE: 17 BRPM | WEIGHT: 229.94 LBS | BODY MASS INDEX: 43 KG/M2 | HEART RATE: 45 BPM | SYSTOLIC BLOOD PRESSURE: 90 MMHG | OXYGEN SATURATION: 73 % | TEMPERATURE: 98 F | DIASTOLIC BLOOD PRESSURE: 54 MMHG

## 2022-01-01 DIAGNOSIS — E87.5 HYPERKALEMIA: ICD-10-CM

## 2022-01-01 DIAGNOSIS — N17.9 AKI (ACUTE KIDNEY INJURY) (HCC): Primary | ICD-10-CM

## 2022-01-01 DIAGNOSIS — R00.1 JUNCTIONAL BRADYCARDIA: ICD-10-CM

## 2022-01-01 DIAGNOSIS — N93.9 VAGINAL BLEEDING: ICD-10-CM

## 2022-01-01 LAB
ALBUMIN SERPL-MCNC: 2.5 G/DL (ref 3.4–5)
ALBUMIN/GLOB SERPL: 0.5 {RATIO} (ref 1–2)
ALP LIVER SERPL-CCNC: 59 U/L
ALT SERPL-CCNC: 12 U/L
ANION GAP SERPL CALC-SCNC: 12 MMOL/L (ref 0–18)
ANION GAP SERPL CALC-SCNC: 7 MMOL/L (ref 0–18)
ANION GAP SERPL CALC-SCNC: 8 MMOL/L (ref 0–18)
ANION GAP SERPL CALC-SCNC: 9 MMOL/L (ref 0–18)
APTT PPP: 30.6 SECONDS (ref 23.3–35.6)
ARTERIAL PATENCY WRIST A: POSITIVE
AST SERPL-CCNC: 14 U/L (ref 15–37)
ATRIAL RATE: 27 BPM
BASE EXCESS BLDA CALC-SCNC: 3.5 MMOL/L (ref ?–2)
BASE EXCESS BLDV CALC-SCNC: 0.8 MMOL/L
BASOPHILS # BLD AUTO: 0.05 X10(3) UL (ref 0–0.2)
BASOPHILS NFR BLD AUTO: 0.3 %
BILIRUB SERPL-MCNC: 0.4 MG/DL (ref 0.1–2)
BILIRUB UR QL STRIP.AUTO: NEGATIVE
BODY TEMPERATURE: 98.6 F
BUN BLD-MCNC: 62 MG/DL (ref 7–18)
BUN BLD-MCNC: 63 MG/DL (ref 7–18)
BUN BLD-MCNC: 68 MG/DL (ref 7–18)
BUN BLD-MCNC: 76 MG/DL (ref 7–18)
CA-I BLD-SCNC: 1.1 MMOL/L (ref 0.95–1.32)
CALCIUM BLD-MCNC: 9.1 MG/DL (ref 8.5–10.1)
CALCIUM BLD-MCNC: 9.1 MG/DL (ref 8.5–10.1)
CALCIUM BLD-MCNC: 9.2 MG/DL (ref 8.5–10.1)
CALCIUM BLD-MCNC: 9.3 MG/DL (ref 8.5–10.1)
CHLORIDE SERPL-SCNC: 100 MMOL/L (ref 98–112)
CHLORIDE SERPL-SCNC: 101 MMOL/L (ref 98–112)
CHLORIDE SERPL-SCNC: 102 MMOL/L (ref 98–112)
CHLORIDE SERPL-SCNC: 104 MMOL/L (ref 98–112)
CHOLEST SERPL-MCNC: 87 MG/DL (ref ?–200)
CO2 SERPL-SCNC: 13 MMOL/L (ref 21–32)
CO2 SERPL-SCNC: 21 MMOL/L (ref 21–32)
CO2 SERPL-SCNC: 25 MMOL/L (ref 21–32)
CO2 SERPL-SCNC: 25 MMOL/L (ref 21–32)
COHGB MFR BLD: 1.6 % SAT (ref 0–3)
COLOR UR AUTO: YELLOW
CREAT BLD-MCNC: 3.43 MG/DL
CREAT BLD-MCNC: 3.52 MG/DL
CREAT BLD-MCNC: 3.73 MG/DL
CREAT BLD-MCNC: 3.74 MG/DL
CREAT UR-SCNC: 150 MG/DL
D DIMER PPP FEU-MCNC: 6.75 UG/ML FEU (ref ?–0.85)
EOSINOPHIL # BLD AUTO: 0.06 X10(3) UL (ref 0–0.7)
EOSINOPHIL NFR BLD AUTO: 0.4 %
ERYTHROCYTE [DISTWIDTH] IN BLOOD BY AUTOMATED COUNT: 14.6 %
FIO2: 100 %
GFR SERPLBLD BASED ON 1.73 SQ M-ARVRAT: 11 ML/MIN/1.73M2 (ref 60–?)
GFR SERPLBLD BASED ON 1.73 SQ M-ARVRAT: 11 ML/MIN/1.73M2 (ref 60–?)
GFR SERPLBLD BASED ON 1.73 SQ M-ARVRAT: 12 ML/MIN/1.73M2 (ref 60–?)
GFR SERPLBLD BASED ON 1.73 SQ M-ARVRAT: 13 ML/MIN/1.73M2 (ref 60–?)
GLOBULIN PLAS-MCNC: 4.9 G/DL (ref 2.8–4.4)
GLUCOSE BLD-MCNC: 137 MG/DL (ref 70–99)
GLUCOSE BLD-MCNC: 155 MG/DL (ref 70–99)
GLUCOSE BLD-MCNC: 166 MG/DL (ref 70–99)
GLUCOSE BLD-MCNC: 175 MG/DL (ref 70–99)
GLUCOSE BLD-MCNC: 199 MG/DL (ref 70–99)
GLUCOSE UR STRIP.AUTO-MCNC: NEGATIVE MG/DL
HCO3 BLDA-SCNC: 27.7 MEQ/L (ref 21–27)
HCO3 BLDV-SCNC: 23.9 MEQ/L (ref 22–26)
HCT VFR BLD AUTO: 35.2 %
HDLC SERPL-MCNC: 36 MG/DL (ref 40–59)
HGB BLD-MCNC: 11.4 G/DL
HGB BLD-MCNC: 11.9 G/DL
IMM GRANULOCYTES # BLD AUTO: 0.09 X10(3) UL (ref 0–1)
IMM GRANULOCYTES NFR BLD: 0.5 %
INR BLD: 1.21 (ref 0.85–1.16)
KETONES UR STRIP.AUTO-MCNC: NEGATIVE MG/DL
L/M: 40 L/MIN
LDLC SERPL CALC-MCNC: 29 MG/DL (ref ?–100)
LEUKOCYTE ESTERASE UR QL STRIP.AUTO: NEGATIVE
LYMPHOCYTES # BLD AUTO: 2.34 X10(3) UL (ref 1–4)
LYMPHOCYTES NFR BLD AUTO: 14.2 %
MCH RBC QN AUTO: 31.8 PG (ref 26–34)
MCHC RBC AUTO-ENTMCNC: 32.4 G/DL (ref 31–37)
MCV RBC AUTO: 98.3 FL
METHGB MFR BLD: 0.7 % SAT (ref 0.4–1.5)
MONOCYTES # BLD AUTO: 1.77 X10(3) UL (ref 0.1–1)
MONOCYTES NFR BLD AUTO: 10.8 %
NEUTROPHILS # BLD AUTO: 12.13 X10 (3) UL (ref 1.5–7.7)
NEUTROPHILS # BLD AUTO: 12.13 X10(3) UL (ref 1.5–7.7)
NEUTROPHILS NFR BLD AUTO: 73.8 %
NITRITE UR QL STRIP.AUTO: NEGATIVE
NONHDLC SERPL-MCNC: 51 MG/DL (ref ?–130)
NT-PROBNP SERPL-MCNC: 9959 PG/ML (ref ?–450)
OSMOLALITY SERPL CALC.SUM OF ELEC: 293 MOSM/KG (ref 275–295)
OSMOLALITY SERPL CALC.SUM OF ELEC: 295 MOSM/KG (ref 275–295)
OSMOLALITY SERPL CALC.SUM OF ELEC: 298 MOSM/KG (ref 275–295)
OSMOLALITY SERPL CALC.SUM OF ELEC: 301 MOSM/KG (ref 275–295)
OXYHGB MFR BLDA: 96.9 % (ref 92–100)
OXYHGB MFR BLDV: 37.5 % (ref 72–78)
PCO2 BLDA: 34 MM HG (ref 35–45)
PCO2 BLDV: 43 MM HG (ref 38–50)
PH BLDA: 7.5 [PH] (ref 7.35–7.45)
PH BLDV: 7.39 [PH] (ref 7.33–7.43)
PH UR STRIP.AUTO: 5.5 [PH] (ref 5–8)
PLATELET # BLD AUTO: 294 10(3)UL (ref 150–450)
PO2 BLDA: 104 MM HG (ref 80–100)
PO2 BLDV: 23 MM HG (ref 30–50)
POTASSIUM BLD-SCNC: 5.9 MMOL/L (ref 3.6–5.1)
POTASSIUM SERPL-SCNC: 5.5 MMOL/L (ref 3.5–5.1)
POTASSIUM SERPL-SCNC: 5.8 MMOL/L (ref 3.5–5.1)
POTASSIUM SERPL-SCNC: 5.9 MMOL/L (ref 3.5–5.1)
POTASSIUM SERPL-SCNC: 6.3 MMOL/L (ref 3.5–5.1)
PROT SERPL-MCNC: 7.4 G/DL (ref 6.4–8.2)
PROTHROMBIN TIME: 15.3 SECONDS (ref 11.6–14.8)
Q-T INTERVAL: 564 MS
QRS DURATION: 98 MS
QTC CALCULATION (BEZET): 405 MS
R AXIS: 66 DEGREES
RBC # BLD AUTO: 3.58 X10(6)UL
SARS-COV-2 RNA RESP QL NAA+PROBE: NOT DETECTED
SODIUM BLD-SCNC: 130 MMOL/L (ref 135–145)
SODIUM SERPL-SCNC: 129 MMOL/L (ref 136–145)
SODIUM SERPL-SCNC: 132 MMOL/L (ref 136–145)
SODIUM SERPL-SCNC: 132 MMOL/L (ref 136–145)
SODIUM SERPL-SCNC: 134 MMOL/L (ref 136–145)
SODIUM SERPL-SCNC: 20 MMOL/L
SP GR UR STRIP.AUTO: 1.02 (ref 1–1.03)
T AXIS: 26 DEGREES
T4 FREE SERPL-MCNC: 1.3 NG/DL (ref 0.8–1.7)
TRIGL SERPL-MCNC: 122 MG/DL (ref 30–149)
TROPONIN I HIGH SENSITIVITY: 188 NG/L
TSI SER-ACNC: 5.43 MIU/ML (ref 0.36–3.74)
UROBILINOGEN UR STRIP.AUTO-MCNC: 0.2 MG/DL
VENTRICULAR RATE: 31 BPM
VLDLC SERPL CALC-MCNC: 16 MG/DL (ref 0–30)
WBC # BLD AUTO: 16.4 X10(3) UL (ref 4–11)

## 2022-01-01 PROCEDURE — 93306 TTE W/DOPPLER COMPLETE: CPT | Performed by: HOSPITALIST

## 2022-01-01 PROCEDURE — 99223 1ST HOSP IP/OBS HIGH 75: CPT | Performed by: HOSPITALIST

## 2022-01-01 PROCEDURE — 5A0945A ASSISTANCE WITH RESPIRATORY VENTILATION, 24-96 CONSECUTIVE HOURS, HIGH NASAL FLOW/VELOCITY: ICD-10-PCS | Performed by: HOSPITALIST

## 2022-01-01 PROCEDURE — 99291 CRITICAL CARE FIRST HOUR: CPT | Performed by: INTERNAL MEDICINE

## 2022-01-01 PROCEDURE — 3E033XZ INTRODUCTION OF VASOPRESSOR INTO PERIPHERAL VEIN, PERCUTANEOUS APPROACH: ICD-10-PCS | Performed by: HOSPITALIST

## 2022-01-01 PROCEDURE — 99233 SBSQ HOSP IP/OBS HIGH 50: CPT | Performed by: HOSPITALIST

## 2022-01-01 PROCEDURE — 71045 X-RAY EXAM CHEST 1 VIEW: CPT | Performed by: HOSPITALIST

## 2022-01-01 RX ORDER — SCOLOPAMINE TRANSDERMAL SYSTEM 1 MG/1
1 PATCH, EXTENDED RELEASE TRANSDERMAL
Status: DISCONTINUED | OUTPATIENT
Start: 2022-01-01 | End: 2022-01-01

## 2022-01-01 RX ORDER — HALOPERIDOL 5 MG/ML
2 INJECTION INTRAMUSCULAR
Status: DISCONTINUED | OUTPATIENT
Start: 2022-01-01 | End: 2022-01-01

## 2022-01-01 RX ORDER — ASPIRIN 81 MG/1
81 TABLET ORAL DAILY
Status: DISCONTINUED | OUTPATIENT
Start: 2022-01-01 | End: 2022-01-01

## 2022-01-01 RX ORDER — DEXTROSE MONOHYDRATE 25 G/50ML
50 INJECTION, SOLUTION INTRAVENOUS ONCE
Status: COMPLETED | OUTPATIENT
Start: 2022-01-01 | End: 2022-01-01

## 2022-01-01 RX ORDER — OXYBUTYNIN CHLORIDE 5 MG/1
5 TABLET, EXTENDED RELEASE ORAL DAILY
Status: DISCONTINUED | OUTPATIENT
Start: 2022-01-01 | End: 2022-01-01

## 2022-01-01 RX ORDER — ACETAMINOPHEN 160 MG/5ML
650 SOLUTION ORAL EVERY 6 HOURS SCHEDULED
Status: DISCONTINUED | OUTPATIENT
Start: 2022-01-01 | End: 2022-01-01

## 2022-01-01 RX ORDER — HEPARIN SODIUM 5000 [USP'U]/ML
5000 INJECTION, SOLUTION INTRAVENOUS; SUBCUTANEOUS EVERY 12 HOURS SCHEDULED
Status: DISCONTINUED | OUTPATIENT
Start: 2022-01-01 | End: 2022-01-01

## 2022-01-01 RX ORDER — PHENTOLAMINE MESYLATE 5 MG/ML
10 INJECTION, POWDER, FOR SOLUTION INTRAMUSCULAR; INTRAVENOUS
Status: COMPLETED | OUTPATIENT
Start: 2022-01-01 | End: 2022-01-01

## 2022-01-01 RX ORDER — ACETAMINOPHEN 650 MG/1
650 SUPPOSITORY RECTAL EVERY 6 HOURS SCHEDULED
Status: DISCONTINUED | OUTPATIENT
Start: 2022-01-01 | End: 2022-01-01

## 2022-01-01 RX ORDER — LORAZEPAM 2 MG/ML
1 INJECTION INTRAMUSCULAR EVERY 2 HOUR PRN
Status: DISCONTINUED | OUTPATIENT
Start: 2022-01-01 | End: 2022-01-01

## 2022-01-01 RX ORDER — HALOPERIDOL 5 MG/ML
1 INJECTION INTRAMUSCULAR
Status: DISCONTINUED | OUTPATIENT
Start: 2022-01-01 | End: 2022-01-01

## 2022-01-01 RX ORDER — ALLOPURINOL 100 MG/1
100 TABLET ORAL DAILY
Status: DISCONTINUED | OUTPATIENT
Start: 2022-01-01 | End: 2022-01-01

## 2022-01-01 RX ORDER — CALCIUM GLUCONATE 10 MG/ML
1 INJECTION, SOLUTION INTRAVENOUS ONCE
Status: COMPLETED | OUTPATIENT
Start: 2022-01-01 | End: 2022-01-01

## 2022-01-01 RX ORDER — FUROSEMIDE 10 MG/ML
20 INJECTION INTRAMUSCULAR; INTRAVENOUS ONCE
Status: DISCONTINUED | OUTPATIENT
Start: 2022-01-01 | End: 2022-01-01

## 2022-01-01 RX ORDER — FUROSEMIDE 10 MG/ML
40 INJECTION INTRAMUSCULAR; INTRAVENOUS EVERY 8 HOURS PRN
Status: DISCONTINUED | OUTPATIENT
Start: 2022-01-01 | End: 2022-01-01

## 2022-01-01 RX ORDER — BUMETANIDE 0.25 MG/ML
4 INJECTION, SOLUTION INTRAMUSCULAR; INTRAVENOUS ONCE
Status: COMPLETED | OUTPATIENT
Start: 2022-01-01 | End: 2022-01-01

## 2022-01-01 RX ORDER — SODIUM CHLORIDE 9 MG/ML
INJECTION, SOLUTION INTRAVENOUS CONTINUOUS
Status: DISCONTINUED | OUTPATIENT
Start: 2022-01-01 | End: 2022-01-01

## 2022-01-01 RX ORDER — GABAPENTIN 100 MG/1
200 CAPSULE ORAL EVERY MORNING
Status: DISCONTINUED | OUTPATIENT
Start: 2022-01-01 | End: 2022-01-01

## 2022-01-01 RX ORDER — METOCLOPRAMIDE HYDROCHLORIDE 5 MG/ML
5 INJECTION INTRAMUSCULAR; INTRAVENOUS EVERY 8 HOURS PRN
Status: DISCONTINUED | OUTPATIENT
Start: 2022-01-01 | End: 2022-01-01

## 2022-01-01 RX ORDER — GABAPENTIN 100 MG/1
100 CAPSULE ORAL 2 TIMES DAILY
Status: DISCONTINUED | OUTPATIENT
Start: 2022-01-01 | End: 2022-01-01

## 2022-01-01 RX ORDER — FUROSEMIDE 40 MG/1
40 TABLET ORAL EVERY 8 HOURS PRN
Status: DISCONTINUED | OUTPATIENT
Start: 2022-01-01 | End: 2022-01-01

## 2022-01-01 RX ORDER — DOPAMINE HYDROCHLORIDE 320 MG/100ML
INJECTION, SOLUTION INTRAVENOUS CONTINUOUS
Status: DISCONTINUED | OUTPATIENT
Start: 2022-01-01 | End: 2022-01-01

## 2022-01-01 RX ORDER — LORAZEPAM 2 MG/ML
1 CONCENTRATE ORAL EVERY 2 HOUR PRN
Status: DISCONTINUED | OUTPATIENT
Start: 2022-01-01 | End: 2022-01-01

## 2022-01-01 RX ORDER — ONDANSETRON 2 MG/ML
4 INJECTION INTRAMUSCULAR; INTRAVENOUS EVERY 6 HOURS PRN
Status: DISCONTINUED | OUTPATIENT
Start: 2022-01-01 | End: 2022-01-01

## 2022-01-01 RX ORDER — SODIUM POLYSTYRENE SULFONATE 4.1 MEQ/G
15 POWDER, FOR SUSPENSION ORAL; RECTAL ONCE
Status: DISCONTINUED | OUTPATIENT
Start: 2022-01-01 | End: 2022-01-01

## 2022-03-27 ENCOUNTER — APPOINTMENT (OUTPATIENT)
Dept: GENERAL RADIOLOGY | Facility: HOSPITAL | Age: 87
End: 2022-03-27
Attending: EMERGENCY MEDICINE
Payer: MEDICARE

## 2022-03-27 ENCOUNTER — HOSPITAL ENCOUNTER (EMERGENCY)
Facility: HOSPITAL | Age: 87
Discharge: HOME | End: 2022-03-28
Attending: EMERGENCY MEDICINE
Payer: MEDICARE

## 2022-03-27 DIAGNOSIS — R60.9 PERIPHERAL EDEMA: ICD-10-CM

## 2022-03-27 DIAGNOSIS — L03.119 CELLULITIS OF LOWER EXTREMITY, UNSPECIFIED LATERALITY: ICD-10-CM

## 2022-03-27 DIAGNOSIS — S81.819A LACERATION OF LOWER EXTREMITY, UNSPECIFIED LATERALITY, INITIAL ENCOUNTER: Primary | ICD-10-CM

## 2022-03-27 LAB
ALBUMIN SERPL-MCNC: 2.9 G/DL (ref 3.4–5)
ALBUMIN/GLOB SERPL: 0.7 {RATIO} (ref 1–2)
ALP LIVER SERPL-CCNC: 64 U/L
ALT SERPL-CCNC: 11 U/L
ANION GAP SERPL CALC-SCNC: 6 MMOL/L (ref 0–18)
AST SERPL-CCNC: 16 U/L (ref 15–37)
BASOPHILS # BLD AUTO: 0.07 X10(3) UL (ref 0–0.2)
BASOPHILS NFR BLD AUTO: 0.7 %
BILIRUB SERPL-MCNC: 0.4 MG/DL (ref 0.1–2)
BUN BLD-MCNC: 17 MG/DL (ref 7–18)
CALCIUM BLD-MCNC: 8.9 MG/DL (ref 8.5–10.1)
CHLORIDE SERPL-SCNC: 105 MMOL/L (ref 98–112)
CO2 SERPL-SCNC: 29 MMOL/L (ref 21–32)
CREAT BLD-MCNC: 1.36 MG/DL
D DIMER PPP FEU-MCNC: 1.39 UG/ML FEU (ref ?–0.85)
EOSINOPHIL # BLD AUTO: 0.62 X10(3) UL (ref 0–0.7)
EOSINOPHIL NFR BLD AUTO: 6.6 %
ERYTHROCYTE [DISTWIDTH] IN BLOOD BY AUTOMATED COUNT: 13.6 %
GLOBULIN PLAS-MCNC: 4.2 G/DL (ref 2.8–4.4)
GLUCOSE BLD-MCNC: 143 MG/DL (ref 70–99)
HCT VFR BLD AUTO: 38.3 %
HGB BLD-MCNC: 12.3 G/DL
IMM GRANULOCYTES # BLD AUTO: 0.03 X10(3) UL (ref 0–1)
IMM GRANULOCYTES NFR BLD: 0.3 %
LYMPHOCYTES # BLD AUTO: 2.08 X10(3) UL (ref 1–4)
LYMPHOCYTES NFR BLD AUTO: 22.3 %
MCH RBC QN AUTO: 32.5 PG (ref 26–34)
MCHC RBC AUTO-ENTMCNC: 32.1 G/DL (ref 31–37)
MCV RBC AUTO: 101.1 FL
MONOCYTES # BLD AUTO: 1.08 X10(3) UL (ref 0.1–1)
MONOCYTES NFR BLD AUTO: 11.6 %
NEUTROPHILS # BLD AUTO: 5.46 X10 (3) UL (ref 1.5–7.7)
NEUTROPHILS # BLD AUTO: 5.46 X10(3) UL (ref 1.5–7.7)
NEUTROPHILS NFR BLD AUTO: 58.5 %
NT-PROBNP SERPL-MCNC: 608 PG/ML (ref ?–450)
OSMOLALITY SERPL CALC.SUM OF ELEC: 294 MOSM/KG (ref 275–295)
PLATELET # BLD AUTO: 239 10(3)UL (ref 150–450)
POTASSIUM SERPL-SCNC: 4.3 MMOL/L (ref 3.5–5.1)
PROT SERPL-MCNC: 7.1 G/DL (ref 6.4–8.2)
RBC # BLD AUTO: 3.79 X10(6)UL
SODIUM SERPL-SCNC: 140 MMOL/L (ref 136–145)
WBC # BLD AUTO: 9.3 X10(3) UL (ref 4–11)

## 2022-03-27 PROCEDURE — 85025 COMPLETE CBC W/AUTO DIFF WBC: CPT | Performed by: EMERGENCY MEDICINE

## 2022-03-27 PROCEDURE — 36415 COLL VENOUS BLD VENIPUNCTURE: CPT

## 2022-03-27 PROCEDURE — 99284 EMERGENCY DEPT VISIT MOD MDM: CPT

## 2022-03-27 PROCEDURE — 83880 ASSAY OF NATRIURETIC PEPTIDE: CPT | Performed by: EMERGENCY MEDICINE

## 2022-03-27 PROCEDURE — 12004 RPR S/N/AX/GEN/TRK7.6-12.5CM: CPT

## 2022-03-27 PROCEDURE — 80053 COMPREHEN METABOLIC PANEL: CPT | Performed by: EMERGENCY MEDICINE

## 2022-03-27 PROCEDURE — 85379 FIBRIN DEGRADATION QUANT: CPT | Performed by: EMERGENCY MEDICINE

## 2022-03-27 PROCEDURE — 73523 X-RAY EXAM HIPS BI 5/> VIEWS: CPT | Performed by: EMERGENCY MEDICINE

## 2022-03-27 PROCEDURE — 73590 X-RAY EXAM OF LOWER LEG: CPT | Performed by: EMERGENCY MEDICINE

## 2022-03-27 PROCEDURE — 99285 EMERGENCY DEPT VISIT HI MDM: CPT

## 2022-03-27 RX ORDER — TOLTERODINE 4 MG/1
4 CAPSULE, EXTENDED RELEASE ORAL DAILY
COMMUNITY

## 2022-03-27 RX ORDER — GABAPENTIN 100 MG/1
100 CAPSULE ORAL 2 TIMES DAILY
COMMUNITY

## 2022-03-27 RX ORDER — ALLOPURINOL 100 MG/1
100 TABLET ORAL DAILY
COMMUNITY

## 2022-03-27 RX ORDER — GABAPENTIN 100 MG/1
200 CAPSULE ORAL EVERY MORNING
COMMUNITY

## 2022-03-27 RX ORDER — TORSEMIDE 20 MG/1
20 TABLET ORAL DAILY
COMMUNITY

## 2022-03-28 ENCOUNTER — APPOINTMENT (OUTPATIENT)
Dept: ULTRASOUND IMAGING | Facility: HOSPITAL | Age: 87
End: 2022-03-28
Attending: EMERGENCY MEDICINE
Payer: MEDICARE

## 2022-03-28 VITALS
HEART RATE: 84 BPM | RESPIRATION RATE: 16 BRPM | HEIGHT: 61 IN | WEIGHT: 230 LBS | DIASTOLIC BLOOD PRESSURE: 80 MMHG | SYSTOLIC BLOOD PRESSURE: 107 MMHG | BODY MASS INDEX: 43.43 KG/M2 | TEMPERATURE: 98 F | OXYGEN SATURATION: 99 %

## 2022-03-28 PROCEDURE — 93970 EXTREMITY STUDY: CPT | Performed by: EMERGENCY MEDICINE

## 2022-03-28 RX ORDER — CEPHALEXIN 500 MG/1
500 CAPSULE ORAL 3 TIMES DAILY
Qty: 30 CAPSULE | Refills: 0 | Status: SHIPPED | OUTPATIENT
Start: 2022-03-28 | End: 2022-04-07

## 2022-08-20 PROBLEM — N93.9 VAGINAL BLEEDING: Status: ACTIVE | Noted: 2022-01-01

## 2022-08-20 PROBLEM — N17.9 AKI (ACUTE KIDNEY INJURY) (HCC): Status: ACTIVE | Noted: 2022-01-01

## 2022-08-20 PROBLEM — E87.5 HYPERKALEMIA: Status: ACTIVE | Noted: 2022-01-01

## 2022-08-20 PROBLEM — R00.1 JUNCTIONAL BRADYCARDIA: Status: ACTIVE | Noted: 2022-01-01

## 2022-08-20 NOTE — ED QUICK NOTES
Restraint order d/c'd as Pt does not require them when left alone; restraints recommended when performing medical interventions as she can be combative & uncooperative.

## 2022-08-21 NOTE — PROGRESS NOTES
Patient arrived from ED at [address] Lissy with noted blood and stool. Changed. Patient HR 27-40s. Denies s/s of bradycardia. Agitated and yelling. Pulling at tubes. Restraints maintained. POLST form noted in chart. Marked to attempt CPR in event of asystole and apnea. Sons say at this time patient to remain a full code until they speak with patient's spouse, because they believe this may have been an incorrectly marked box.

## 2022-08-21 NOTE — PLAN OF CARE
Assumed patient care at 0730. Patient with vaginal bleeding - MD aware. Family chose to initiate comfort measures. Morphine drip infusing. Patient intermittently agitated. See EMAR for PRN medication administration. Family questions and concerns addressed. Patient not a candidate for gift of hope- case number [id number].     Problem: Risk for Violence/Aggression  Goal: Absence of Violence/Aggression  Description: INTERVENTIONS:   - Identify precipitating factors for behavior   - Notify Charge RN/Provider   - Consider decreasing stimulation   - Consider distraction measures   - Consider discussion with provider regarding prn meds    - Consider RINKU (Moderate Risk only)   - Consider Code Support (High Risk only)   - Consider room safety checks   - Consider restraints  Outcome: Not Progressing     Problem: Safety Risk - Non-Violent Restraints  Goal: Patient will remain free from self-harm  Description: INTERVENTIONS:  - Apply the least restrictive restraint to prevent harm  - Notify patient and family of reasons restraints applied  - Assess for any contributing factors to confusion (electrolyte disturbances, delirium, medications)  - Discontinue any unnecessary medical devices as soon as possible  - Assess the patient's physical comfort, circulation, skin condition, hydration, nutrition and elimination needs   - Reorient and redirection as needed  - Assess for the need to continue restraints  Outcome: Not Progressing

## 2022-08-21 NOTE — PROGRESS NOTES
22 2003   Clinical Encounter Type   Visited With Health care provider;Patient and family together   Continue Visiting No  (unless requested by family)   Patient's Supportive Strategies/Resources family and mohit   Referral From Nurse   Sacramental Encounters   Sacrament of Sick-Anointing Family requested anointing  (Jd Jose to annoint patient today.)   Taxonomy   Intended Effects Convey a calming presence   Methods Offer emotional support; Offer spiritual/Scientologist support;Assist with spiritual/Scientologist practices; Collaborate with care team member   Interventions Acknowledge current situation; Active listening; Ask guided questions; Ask guided questions about mohit; Identify supportive relationship(s); Lee for care team member(s);Harvey;Explain  role   Present with family as they process grief.  home information obtained and son spoke with them regarding their services for patient. For further spiritual care, page [phone] as needed.

## 2022-08-21 NOTE — PROGRESS NOTES
Rn notified of pt's heart rate in 20's, blood pressure 74/55 and 115/35. Baseline dementia, denies s/s of bradycardia. Case discussed with Dr Chago Chery and ordered for transcutaneous pacing.

## 2022-08-21 NOTE — PLAN OF CARE
Assumed care of patient at 1. Patient is confused and oriented to self. Patient was on 2L of oxygen however she would desaturate and return back to baseline. However as the night progressed she was sustaining the desaturation and RT was contacted. Vapotherm was started and the hospitalist was notified. Normal saline was stopped per MD request for possible fluid overload/pulmonary edema. The patients leg dressings were changed. The patient is very combative and 3 other nurses were needed. Will continue to monitor patient.

## 2022-08-21 NOTE — PROGRESS NOTES
Spoke to son Aron Olivarez and Noemi Dyer as well as  Sergio Her ( over the phone) today. They expressed that Clara Smith did not want any life prolonging measures and would NOT want a Pacemaker or be started on HD. They state she wished to be a DNR and they would like to proceed with comfort focused measures     Discussed with RN and Dr. Gloria Adorno    Will place on comfort focused measures with supplemental o2 per NC, wean dopamine and pain medications PRN    They would like to talk to other family members and will let us know when to proceed. Reagan Marion M.D.   Prisca Mineful

## 2022-08-21 NOTE — PROGRESS NOTES
[facility]  Progress Note    Duane Big Rokos Patient Status:  Inpatient    1935 MRN [id number]   [facility] 6NE-A Attending Piero Campos MD   Hosp Day # 1 PCP Jose David Black MD     Assessment:  #Bradycardia possibly from beta blocker, hyperkalemia and age. Treat with dopamine. Discuss with family but with her dementia would favor hospice. Attempted to push me and nurse.    #Moderate aortic stenosis by exam. Echo today  #HTN  #CAT on CKD with K 5.8  #Hx CAD  #Hx CHF

## 2022-08-22 NOTE — PLAN OF CARE
Received Zac Fiddler with a Morphine gtt infusing and on comfort care measures. Pt appears comfortable. Jiang in place. Family at bedside. Questions and concerns addressed. Continued to monitor.       Problem: Risk for Violence/Aggression  Goal: Absence of Violence/Aggression  Description: INTERVENTIONS:   - Identify precipitating factors for behavior   - Notify Charge RN/Provider   - Consider decreasing stimulation   - Consider distraction measures   - Consider discussion with provider regarding prn meds    - Consider RINKU (Moderate Risk only)   - Consider Code Support (High Risk only)   - Consider room safety checks   - Consider restraints  Outcome: Completed     Problem: Safety Risk - Non-Violent Restraints  Goal: Patient will remain free from self-harm  Description: INTERVENTIONS:  - Apply the least restrictive restraint to prevent harm  - Notify patient and family of reasons restraints applied  - Assess for any contributing factors to confusion (electrolyte disturbances, delirium, medications)  - Discontinue any unnecessary medical devices as soon as possible  - Assess the patient's physical comfort, circulation, skin condition, hydration, nutrition and elimination needs   - Reorient and redirection as needed  - Assess for the need to continue restraints  Outcome: Completed

## 2022-08-22 NOTE — CONSULTS
[facility]    PATIENT'S NAME: Niraj Zaldivar   ATTENDING PHYSICIAN: Eric Crenshaw M.D.   CONSULTING PHYSICIAN: True Pallas, M.D. PATIENT ACCOUNT#:   [de-identified]    LOCATION:  [address] [place] [address]  MEDICAL RECORD #:   [id number]       YOB: 1935  ADMISSION DATE:       08/20/2022      CONSULT DATE:  08/21/2022    REPORT OF CONSULTATION    HISTORY OF PRESENT ILLNESS:  An 52-year-old female with history of dementia, prior coronary artery disease, heart failure, who was brought to the emergency room for evaluation for low heart rate. She has been on atenolol. She was found to have a junctional rhythm. We placed her on dopamine. Patient is a DNR and Do Not Intubate. When I saw her, she told me to leave the room several times. She had attempted to push me as well as the nurse. Clearly is confused. She denies any chest pain or shortness of breath. According to the history, has been combative since she got to the emergency room. PAST MEDICAL HISTORY:  Dementia with behavioral disorder, history of CHF, coronary heart disease, hypertension. MEDICATIONS:  As listed, including gabapentin, torsemide, atenolol, amlodipine, albuterol, potassium. ALLERGIES:  Tetanus. SOCIAL HISTORY:  Patient quit smoking 40 years ago. FAMILY HISTORY:  Remarkable for a brother with diabetes. REVIEW OF SYSTEMS:  I asked all the questions but clearly she cannot respond appropriately. PHYSICAL EXAMINATION:    GENERAL:  Patient is awake, alert, confused. VITAL SIGNS:  Blood pressure 98/60, pulse 28. HEENT:  Pupils equal and reactive to light and accommodation. NECK:  No JVD at 30 degrees. No bruits. LUNGS:  Clear to auscultation. HEART:  Sinus bradycardia. S1, S2. There was a mid to late peaking systolic ejection murmur. ABDOMEN:  Soft. EXTREMITIES:  Chronic venous stasis changes with stasis dermatitis, lymphedema. PSYCHIATRIC:  Confused.   NEUROLOGIC:  Cranial nerves intact. LABORATORY DATA:  BUN 60, creatinine 3.4, potassium 5.8. IMPRESSION:    1. Bradycardia, possibly from beta blocker, hyperkalemia, and age. Would treat with dopamine. Discussed with the family. With the dementia, I would favor hospice rather than permanent pacemaker. 2.   Moderate aortic stenosis by exam.  Will check echocardiogram.  3.   Hypertension. 4.   Acute kidney injury, chronic kidney disease with potassium of 5.8.  5.   History of coronary artery disease. 6.   History of congestive heart failure.     Dictated By Chaitanya Donnelly M.D.  d: 08/21/2022 07:57:03  t: 08/21/2022 09:56:06  Job [id number]  FLCHRISTINE/

## 2022-08-22 NOTE — PROGRESS NOTES
[facility]    Death/Expiration note    Damien Campbell Patient Status:  Inpatient    1935 MRN [id number]   [facility] 8NE-A Attending Marly Kruse MD   Hosp Day # 2 PCP Lucia Dominique MD         Date and Time of Death:  2022 08:45      Primary Medical Condition/Diagnosis:    Heart block      Family notified:   patient and son  Yes, at the bedside

## 2022-08-22 NOTE — PROGRESS NOTES
22 0928   Clinical Encounter Type   Visited With Health care provider;Family   Crisis Visit Death   Faith Encounters   Faith Needs Prayer   Family Spiritual Encounters   Family Coping Sadness   Family Participation in Care Consistently   Family Support During Treatment Consistently   Taxonomy   Intended Effects Promote a sense of peace   Methods Encourage self care;Encourage self reflection;Encourage sharing of feelings; Offer spiritual/Worship support; Offer emotional support   Interventions Acknowledge current situation; Active listening; Identify supportive relationship(s); [place]    responded to the page. Checked in with the RN about the status of the patient/  family. Provided grief support and pastoral care to the family in midst of death. Journeyed with the family in the grief process. Affirmed the depth of their grief and loss. Life review/ reminiscence. Provided resources regarding weekly support group that are freely available here in the hospital. Rituals at the end of life was extended. Read a comforting scripture. Prayed for peace and support for the family as per their request. Promoted a sense of peace, comfort and understanding. Family signed consent to release the body to the  home [facility] of their choice. Notified Basil Paul [facility] Director) as per the family request. Extended support to the RN and to our team members. They were all very appreciative of compassionate listening presence and grief support. Rev. Sae Lundberg M.Div., M.T.S., [facility] CGCS.   Spiritual Care Lead

## (undated) NOTE — ED AVS SNAPSHOT
Tanner Conley   MRN: [id number]    Department:  [facility] Emergency Department   Date of Visit:  9/17/2019           Disclosure     Insurance plans vary and the physician(s) referred by the ER may not be covered by your plan.  Please contact your tell this physician (or your personal doctor if your instructions are to return to your personal doctor) about any new or lasting problems. The primary care or specialist physician will see patients referred from the [facility] Emergency Department.  Jeremias Peter